# Patient Record
(demographics unavailable — no encounter records)

---

## 2024-11-06 NOTE — HISTORY OF PRESENT ILLNESS
[Postpartum Follow Up] : postpartum follow up [Delivery Date: ___] : on [unfilled] [Primary C/S] : delivered by  section [Male] : Delivery History: baby boy [Intended Contraception] : Intended Contraception: [Clean/Dry/Intact] : clean, dry and intact [Back to Normal] : is back to normal in size [Mild] : mild vaginal bleeding [Normal] : the vagina was normal [Not Done] : Examination of breasts not done [Doing Well] : is doing well [No Sign of Infection] : is showing no signs of infection [Excellent Pain Control] : has excellent pain control [None] : None [Complications:___] : no complications [Breastfeeding] : not currently nursing [Resumed Menses] : has not resumed her menses [Resumed [place]] : has not resumed intercourse [S/Sx PP Depression] : no signs/symptoms of postpartum depression [Erythema] : not erythematous [Cervix Sample Taken] : cervical sample not taken for a Pap smear [de-identified] : 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

## 2024-11-11 NOTE — HISTORY OF PRESENT ILLNESS
[FreeTextEntry1] : mushroom removed from left posterolateral still ahs 2 setons in place will schedule for eua and fistulotomy in 4-6 weeks to remove the long seton and do a fistulotomy

## 2024-12-23 NOTE — HISTORY OF PRESENT ILLNESS
[FreeTextEntry1] : Angelina Came to see me in the office today with severe perianal pain.  7 days ago she underwent an examination under anesthetic because of recurrent perianal sepsis and 2 days ago she felt that this had now recurred with a new abscess distant to the area dealt with a week ago.  She came to the office today for follow-up accompanied by her partner and 2-month-old baby boy.  She had no complaints of nausea or vomiting or constitutional illness but said that the pain had become worse and worse that she developed a new lump higher up than the previous abscesses.

## 2024-12-23 NOTE — ASSESSMENT
[FreeTextEntry1] : I think this lady needs an examination under anesthetic today with probable drainage of this new abscess.  I have therefore spoken to the team and we will admit her directly from the clinic to have this performed.  I have explained all of this to the patient and her partner and they are happy to have the matter resolved as quickly as possible.

## 2024-12-23 NOTE — PHYSICAL EXAM
[de-identified] : On perianal examination the area dealt with last week but satisfactory.  However she has now developed a new fluctuant abscess on the left side of her  cleft that is about 2 cm in size and is exquisitely tender.

## 2025-01-13 NOTE — ASSESSMENT
[FreeTextEntry1] : Ms. Beaulieu is a 22-year-old lady with recurrent perirectal and pilonidal abscesses.  She still has 1 seton in place.  She is recovering well from her recent surgeries.  The incisions are clean and almost closed.  She is not having any more discharge.  We had a discussion about the next steps.  Regarding the seton that is still in, I wanted to stay there for 4-6 more weeks.  I explained to her that in the second procedure there are 2 scenarios. One, the seton is completely out of the muscle and we can do a fistulotomy.  Two, there is still significant muscle involvement where we can change the seton to a cutting seton.  This depends on the findings at that time.  She is going to follow-up in a month in the office to schedule her next surgery.  She understands and wants to proceed.  I spent 30 minutes with the patient discussing the plan of care.  All questions were answered.

## 2025-01-13 NOTE — HISTORY OF PRESENT ILLNESS
[FreeTextEntry1] : Ms. Beaulieu presented for follow-up.  She states that she is doing very well.  She is not having any more discharge.  The incision sites have almost closed.  Her bowel movements are regular and she is not having any difficulty.  She denies any new swelling, fever, perirectal pain. No bleeding.

## 2025-01-13 NOTE — PHYSICAL EXAM
[No HSM] : no hepatosplenomegaly [No Rash or Lesion] : No rash or lesion [Alert] : alert [Oriented to Person] : oriented to person [Oriented to Place] : oriented to place [Oriented to Time] : oriented to time [Calm] : calm [Abdomen Masses] : No abdominal masses [Abdomen Tenderness] : ~T No ~M abdominal tenderness [Wheezing] : no wheezing was heard [de-identified] : knotless seton in place [de-identified] : well-appearing, awake, alert and oriented x3

## 2025-02-03 NOTE — PHYSICAL EXAM
[No HSM] : no hepatosplenomegaly [No Rash or Lesion] : No rash or lesion [Alert] : alert [Oriented to Person] : oriented to person [Oriented to Place] : oriented to place [Oriented to Time] : oriented to time [Calm] : calm [Abdomen Masses] : No abdominal masses [Abdomen Tenderness] : ~T No ~M abdominal tenderness [Wheezing] : no wheezing was heard [de-identified] : well-appearing, awake, alert and oriented x3 [de-identified] : seton on left lateral

## 2025-02-03 NOTE — ASSESSMENT
[FreeTextEntry1] : Ms. Beaulieu is a 22-year-old lady with perirectal abscess and fistula status post fistulotomy and seton placement.  The seton has been in situ long enough and has recently started to loosen. Her symptoms are much better.  She now presented for the second procedure.  We had a long discussion about the options and the possible scenarios.  I explained to her that it may be a simple fistulotomy or cutting seton depending on the degree of muscle involvement. Recovery is expected to be easier than the first one and skin closure by secondary intention takes about 1-2 weeks. There is a very small possibility that she may require a third procedure if she requires a cutting seton and it doesn't fall off by itself. She understands and wants to proceed.  We are going to schedule her on Feb 19th for EUA, possible fistulotomy, possible cutting seton.  I spent 60 minutes with the patient discussing the plan of care.  All questions were answered.

## 2025-02-03 NOTE — HISTORY OF PRESENT ILLNESS
[FreeTextEntry1] : Ms. Beaulieu presented for preop.  She is currently doing well.  She is not having any discharge from the fistula.  Recently she noticed that the seton has coming loose.  No new swelling, fever, purulent discharge.  Her bowel movements are regular.